# Patient Record
Sex: MALE | Race: WHITE | URBAN - METROPOLITAN AREA
[De-identification: names, ages, dates, MRNs, and addresses within clinical notes are randomized per-mention and may not be internally consistent; named-entity substitution may affect disease eponyms.]

---

## 2021-12-07 ENCOUNTER — HOSPITAL ENCOUNTER (OUTPATIENT)
Dept: WOUND CARE | Age: 36
Discharge: HOME | End: 2021-12-07
Payer: COMMERCIAL

## 2021-12-07 VITALS
DIASTOLIC BLOOD PRESSURE: 86 MMHG | RESPIRATION RATE: 20 BRPM | HEART RATE: 106 BPM | SYSTOLIC BLOOD PRESSURE: 151 MMHG | TEMPERATURE: 95.7 F

## 2021-12-07 DIAGNOSIS — R09.89 DECREASED PULSES IN FEET: Primary | ICD-10-CM

## 2021-12-07 DIAGNOSIS — I87.2 PERIPHERAL VENOUS INSUFFICIENCY: ICD-10-CM

## 2021-12-07 DIAGNOSIS — L97.922 NON-PRESSURE CHRONIC ULCER OF LEFT LOWER LEG WITH FAT LAYER EXPOSED (HCC): ICD-10-CM

## 2021-12-07 DIAGNOSIS — R21 MACULOPAPULAR RASH, GENERALIZED: ICD-10-CM

## 2021-12-07 PROCEDURE — 99213 OFFICE O/P EST LOW 20 MIN: CPT

## 2021-12-07 PROCEDURE — 99203 OFFICE O/P NEW LOW 30 MIN: CPT | Performed by: EMERGENCY MEDICINE

## 2021-12-07 RX ORDER — LEVOTHYROXINE SODIUM 0.07 MG/1
75 TABLET ORAL DAILY
COMMUNITY

## 2021-12-07 RX ORDER — BACITRACIN ZINC AND POLYMYXIN B SULFATE 500; 1000 [USP'U]/G; [USP'U]/G
OINTMENT TOPICAL ONCE
Status: CANCELLED | OUTPATIENT
Start: 2021-12-07 | End: 2021-12-07

## 2021-12-07 RX ORDER — CLOBETASOL PROPIONATE 0.5 MG/G
OINTMENT TOPICAL ONCE
Status: CANCELLED | OUTPATIENT
Start: 2021-12-07 | End: 2021-12-07

## 2021-12-07 RX ORDER — GENTAMICIN SULFATE 1 MG/G
OINTMENT TOPICAL ONCE
Status: CANCELLED | OUTPATIENT
Start: 2021-12-07 | End: 2021-12-07

## 2021-12-07 RX ORDER — GLIPIZIDE 10 MG/1
10 TABLET, FILM COATED, EXTENDED RELEASE ORAL DAILY
COMMUNITY

## 2021-12-07 RX ORDER — LIDOCAINE HYDROCHLORIDE 40 MG/ML
SOLUTION TOPICAL ONCE
Status: CANCELLED | OUTPATIENT
Start: 2021-12-07 | End: 2021-12-07

## 2021-12-07 RX ORDER — BETAMETHASONE DIPROPIONATE 0.05 %
OINTMENT (GRAM) TOPICAL ONCE
Status: CANCELLED | OUTPATIENT
Start: 2021-12-07 | End: 2021-12-07

## 2021-12-07 RX ORDER — BETAMETHASONE DIPROPIONATE 0.05 %
OINTMENT (GRAM) TOPICAL
Qty: 50 G | Refills: 1 | Status: SHIPPED | OUTPATIENT
Start: 2021-12-07

## 2021-12-07 RX ORDER — BACITRACIN, NEOMYCIN, POLYMYXIN B 400; 3.5; 5 [USP'U]/G; MG/G; [USP'U]/G
OINTMENT TOPICAL ONCE
Status: CANCELLED | OUTPATIENT
Start: 2021-12-07 | End: 2021-12-07

## 2021-12-07 RX ORDER — GINSENG 100 MG
CAPSULE ORAL ONCE
Status: CANCELLED | OUTPATIENT
Start: 2021-12-07 | End: 2021-12-07

## 2021-12-07 RX ORDER — LIDOCAINE 50 MG/G
OINTMENT TOPICAL ONCE
Status: CANCELLED | OUTPATIENT
Start: 2021-12-07 | End: 2021-12-07

## 2021-12-07 RX ORDER — LIDOCAINE HYDROCHLORIDE 20 MG/ML
JELLY TOPICAL ONCE
Status: CANCELLED | OUTPATIENT
Start: 2021-12-07 | End: 2021-12-07

## 2021-12-07 RX ORDER — PAROXETINE HYDROCHLORIDE 40 MG/1
40 TABLET, FILM COATED ORAL EVERY MORNING
COMMUNITY

## 2021-12-07 RX ORDER — LIDOCAINE 40 MG/G
CREAM TOPICAL ONCE
Status: CANCELLED | OUTPATIENT
Start: 2021-12-07 | End: 2021-12-07

## 2021-12-07 NOTE — H&P
[address] [facility]   History and Physical Note   Referring Provider: self referral  Reason for Referral: left leg ulcer    Stevenson Blackwood RECORD NUMBER:  [id number]  AGE: 43 y.o. GENDER: male  : 1985  EPISODE DATE:  2021    Chief complaint and reason for visit:     Chief Complaint   Patient presents with    Wound Check     Traumatic wound 2021. Continues to scratch it. Mid October placed on ABx         HISTORY of PRESENT ILLNESS HPI     Austin Alvares is a 43 y.o. male who presents today for an initial evaluation of a wound/ulcer. Patient is new to the wound center. Wound duration: aug 2021, but worse 10/2021. History of Wound Context: 75-year-old male with a history of morbid obesity, who indicated that he had exposure to some weeds or branches sometime in 2021. The patient states that there was some \"bubbling rash\" to the left anterior tibial surface which improved after he used a home remedy of baking soda. The patient then was placed on several courses of antibiotics by his PCP which also improved the wound. He noted a diffuse rash however associated with this wound. He was traveling recently and in the airport had a suitcase strike his left anterior leg which then made the wound much worse now with more areas of irritation and drainage. The patient therefore presents for an evaluation. No associated fever, chills, chest pain, shortness of breath, abdominal pain, nausea, vomiting or diarrhea. Has some mild discomfort at the site of the wound    Pertinent associated symptoms: pain severity: intermittent, mild, pain quality: sharp, burning, drainage , redness, swelling, skin discoloration and impaired mobility    PAST MEDICAL HISTORY        Diagnosis Date    Diabetes mellitus ([address]     Thyroid disease        PAST SURGICAL HISTORY    History reviewed. No pertinent surgical history. FAMILY HISTORY    History reviewed.  No pertinent family history. SOCIAL HISTORY    Social History     Tobacco Use    Smoking status: Never Smoker    Smokeless tobacco: Never Used   Substance Use Topics    Alcohol use: Not Currently    Drug use: Never       ALLERGIES    No Known Allergies    MEDICATIONS    Current Outpatient Medications on File Prior to Encounter   Medication Sig Dispense Refill    PARoxetine (PAXIL) 40 MG tablet Take 40 mg by mouth every morning      metFORMIN (GLUCOPHAGE) 1000 MG tablet Take 1,000 mg by mouth 2 times daily (with meals)      glipiZIDE (GLUCOTROL XL) 10 MG extended release tablet Take 10 mg by mouth daily      levothyroxine (SYNTHROID) 75 MCG tablet Take 75 mcg by mouth Daily       No current facility-administered medications on file prior to encounter. REVIEW OF SYSTEMS  A comprehensive review of systems was negative except for: Pertinent items are noted in HPI. Objective:      BP (!) 151/86   Pulse 106   Temp 95.7 °F (35.4 °C) (Infrared)   Resp 20     Wt Readings from Last 3 Encounters:   No data found for Wt       PHYSICAL EXAM  General Appearance/Constitutional: alert and oriented to person, place and time, well-developed and well nourished, and in no acute distress. Nontoxic. Skin: warm and dry, no rash, positive wound per LDA documentation. Head: normocephalic and atraumatic. Eyes: extraocular eye movements intact, conjunctivae normal, and sclera anicteric. ENT: hearing grossly normal bilaterally. Normal appearance. Pulmonary/Chest: no chest wall tenderness and clear anteriorly. No respiratory distress. Cardiovascular: normal rate and regular rhythm. GI: abdomen soft, non-tender and non-distended. Musculoskeletal: normal range of motion of joints. Nontender calves. No cyanosis. Nontender calves. Edema 3+  Neurologic: no gross cranial nerve deficit and speech normal. No focal deficits.  Mental status normal.      Medical Decision Making:     Patient appears to have a diffuse maculopapular rash, consistent with an allergic reaction. He has areas of petechial lesions as well. He denies any recent tick bite but did admit to exposure to weeds or some tree branches with pruritus leading to a lot of scratching. Patient has tried topical cortisone as well as antihistamines without significant improvement. He developed an ulcer to left anterior tibial surface which improved after being placed on antibiotics by his PCP. Patient also has some labs done, still pending. Problem List Items Addressed This Visit     Maculopapular rash, generalized    Peripheral venous insufficiency    Non-pressure chronic ulcer of left lower leg with fat layer exposed ([address]      Other Visit Diagnoses     Decreased pulses in feet    -  Primary    Relevant Orders    VL DUP LOWER EXTREMITY ARTERIES BILATERAL          Comorbid conditions affecting wound healing: Diabetes mellitus type 2, thyroid disease    Wound evaluation: Left anterior tibial surface with a darkened discoloration, deep erythematous changes without any warmth. Purpuric discoloration as well. Superficial ulcerations throughout with moderate drainage of serous type. Problems addressed during this encounter:  1. Left anterior tibial surface nonpressure chronic ulcer. Fat layers exposed. Silver alginate, compression therapy  2. Venous stasis with stasis dermatitis. Elevation and compression therapy  3. Petechial/maculopapular rash diffuse. Trial of betamethasone to the right lower extremity to see if this will help the rash. Data reviewed and analyzed:  1. Assessment required other independent historian(s): No patient . 2. Review of prior external note from other providers done today: Yes  3. New imaging or lab ordered today: Yes  4. Review of test results done today: No  5. Independent interpretation of test(s): No  6. Discussion of management or test interpretation with other qualified health care professional and other external source.     Risk of complications and/or mortality of patient management:  1. This patient has a moderate risk of morbidity and mortality from additional diagnostic testing or treatment. This is due to the above conditions affecting wound healing as well as patient and procedure risk factors. Education and discussion held with patient regarding these disease processes pertinent to wound(s). 2. Other pertinent decisions include: N/A .  3. The patient's diagnosis or treatment is not significantly limited by social determinants of health as noted by: N/A .  4. Prescription drug management: N/A     35 min spent with patient and patient care issues    Wound/Ulcer Descriptions are Pre Debridement except measurements:    Wound 12/07/21 Leg Left; Lateral; Lower #1 (Active)   Wound Image   12/07/21 1307   Wound Etiology Venous 12/07/21 1307   Wound Cleansed Cleansed with saline 12/07/21 1307   Dressing/Treatment ABD; Ace wrap; Roll gauze; Other (comment) 12/07/21 1524   Wound Length (cm) 4.5 cm 12/07/21 1307   Wound Width (cm) 5 cm 12/07/21 1307   Wound Depth (cm) 0.1 cm 12/07/21 1307   Wound Surface Area (cm^2) 22.5 cm^2 12/07/21 1307   Wound Volume (cm^3) 2.25 cm^3 12/07/21 1307   Wound Assessment Granulation tissue 12/07/21 1307   Drainage Amount Large 12/07/21 1307   Drainage Description Serous 12/07/21 1307   Odor None 12/07/21 1307   Fabi-wound Assessment Hemosiderin staining (brown yellow) 12/07/21 1307   Margins Attached edges; Undefined edges 12/07/21 1307   Number of days: 0       Written patient dismissal instructions given to patient and signed by patient or POA. Discharge Instructions         [address], [address] [place] [address] Road  Telephone: [phone]    Discharge Instructions    Important reminders:    1. Increase Protein intake for optimal wound healing  2. No added salt to reduce any swelling  3. If diabetic, maintain good glucose control  4.  If you smoke, smoking prohibits wound healing, we ask that you refrain from smoking. 5. When taking antibiotics take the entire prescription as ordered. Do not stop taking until medication is all gone unless otherwise instructed. 6. Exercise as tolerated. 7. Keep weight off wounds and reposition every 2 hours if applicable. 8. If wound(s) is on your lower extremity, elevate legs to the level of the heart or above for 30 minutes 4-5 times a day and/or when sitting. Avoid standing for long periods of time. 9. Do not get wounds wet in bath or shower unless otherwise instructed by your physician. If your wound is on your foot or leg, you may purchase a cast bag. Please ask at the pharmacy. If Vascular testing is ordered, please call [facility] ([phone]) to schedule. Vascular tests ordered by Wound Care Physicians may take up to 2 hours to complete. Please keep that in mind when scheduling. If Vascular testing is scheduled, please bring supplies to replace your dressing after testing is done. The vascular department does not stock supplies. Wound: Left Leg    With each dressing change, rinse wounds with 0.9% Saline. (May use wound wash or soft contact solution. Both can be purchased at a local drug store). If unable to obtain saline, may use a gentle soap and water. Dressing care: Left Leg- Silver Alginate, Drawtex, Abd, kerlix Ace wrap. Apply lotion to surrounding skin. Change every other day. Betamethasone to right leg where irritated. Cereve cream.    Home Care Agency:     Your wound-care supplies will be provided by:   Please note, depending on your insurance coverage, you may have out-of-pocket expenses for these supplies. Someone from the company should call you to confirm your order and discuss those potential costs before they ship your products -- please anticipate that call.  If your out-of-pocket cost could be substantial, Many companies have financial hardship programs for patients who qualify, so please ask about that if you might need a hand. If you have any questions about your supplies or your potential out-of-pocket costs, or if you need to place an order for a refill of supplies (typically monthly), please call the company directly. Your  is     Follow up with Dr Chastity Saleh In 1 week(s) in the wound care center. Wound Care Center Information: Should you experience any significant changes in your wound(s) or have questions about your wound care, please contact the [place] [address] at [phone] Monday  - Thursday 8:00 am - 4:00 pm and Friday 8:00 am - 1:00pm. If you need help with your wound outside these hours and cannot wait until we are again available, contact your PCP or go to the hospital emergency room. PLEASE NOTE: IF YOU ARE UNABLE TO OBTAIN WOUND SUPPLIES, CONTINUE TO USE THE SUPPLIES YOU HAVE AVAILABLE UNTIL YOU ARE ABLE TO REACH US. IT IS MOST IMPORTANT TO KEEP THE WOUND COVERED AT ALL TIMES.                 Electronically signed by Chary Sanchez MD on 12/7/2021 at 3:37 PM

## 2021-12-07 NOTE — PROGRESS NOTES
[address] [facility],[address] Floor:      [address] f: [phone] f: [phone] p: [phone] [email or web address]     Ordering Center: [address]  [place] [address]  [phone]  Dept: [phone]   Fax# [phone]    Patient Information:      Ausitn Alvares  [address] [place] [address]   [phone]   : 1985  AGE: 43 y.o. GENDER: male   TODAYS DATE:  2021    Insurance:      PRIMARY INSURANCE:  Plan: BCBS OUT OF STATE  Coverage: BCBS  Effective Date: 2015  Group Number: [unfilled]  Subscriber Number: [id number] - (BX Traditional)    Payor/Plan Subscr  Sex Relation Sub. Ins. ID Effective Group Num   1. [facility] 1985 Male Self [id number] 7/20/15 444453[facility] [address]         Patient Wound Information:     Additional ICD-10 Codes: Traumatic wound    Patient Active Problem List   Diagnosis Code    Maculopapular rash, generalized R21       WOUNDS REQUIRING DRESSING SUPPLIES:     Wound 21 Leg Left; Lateral; Lower #1 (Active)   Wound Image   21 1307   Wound Etiology Venous 21 1307   Wound Cleansed Cleansed with saline 21 1307   Wound Length (cm) 4.5 cm 21 1307   Wound Width (cm) 5 cm 21 1307   Wound Depth (cm) 0.1 cm 21 1307   Wound Surface Area (cm^2) 22.5 cm^2 21 1307   Wound Volume (cm^3) 2.25 cm^3 21 1307   Wound Assessment Granulation tissue 21 1307   Drainage Amount Large 21 1307   Drainage Description Serous 21 1307   Odor None 21 1307   Fabi-wound Assessment Hemosiderin staining (brown yellow) 21 1307   Margins Attached edges;  Undefined edges 21 1307   Number of days: 0          Supplies Requested :      WOUND #: 1   PRIMARY DRESSING:    Alginate with silver pad   Cover and Secure with: ABD pad  Conforming roll gauze  Other Drawtex FREQUENCY OF DRESSING CHANGES:  Every other day    Wound Thickness [x] Full   []Partial                                     Patient Wound(s) Debrided: [x] Yes   [] No    Debridement Date: 12/7/2021    Debribement Type: Mechanical     ADDITIONAL ITEMS:  [] Gloves Small  [] Gloves Medium [x] Gloves Large [] Gloves Monika Blacksmith  [] Paper Tape 1\" [] Paper Tape 2\" [] Paper Tape 3\"  [x] Medipore Tape 3\"  [x] Saline  [] Skin Prep   [] Adhesive Remover   [] Cotton Tip Applicators  [] Tubular Stocking   [] Size E  [] Size G  [] Other:    Patient currently being seen by Home Health: [] Yes   [x] No    Duration for needed supplies:  [x]15  []30  []60  []90 Days    Provider Information:      PROVIDER'S NAME/NPI  Dr Heather Aviles [id number]    I give permission to coordinate the care for this patient

## 2021-12-07 NOTE — PLAN OF CARE
Discharge instructions given. Patient verbalized understanding. Return to [address] [place] [address],[address] in 1 week(s).   Called/faxed orders to [facility]

## 2021-12-21 ENCOUNTER — HOSPITAL ENCOUNTER (OUTPATIENT)
Dept: WOUND CARE | Age: 36
Discharge: HOME | End: 2021-12-21
Payer: COMMERCIAL

## 2021-12-21 VITALS — SYSTOLIC BLOOD PRESSURE: 164 MMHG | TEMPERATURE: 96.9 F | HEART RATE: 91 BPM | DIASTOLIC BLOOD PRESSURE: 90 MMHG

## 2021-12-21 DIAGNOSIS — I87.2 PERIPHERAL VENOUS INSUFFICIENCY: Primary | ICD-10-CM

## 2021-12-21 DIAGNOSIS — R21 MACULOPAPULAR RASH, GENERALIZED: ICD-10-CM

## 2021-12-21 DIAGNOSIS — L97.922 NON-PRESSURE CHRONIC ULCER OF LEFT LOWER LEG WITH FAT LAYER EXPOSED (HCC): ICD-10-CM

## 2021-12-21 DIAGNOSIS — R09.89 DECREASED PULSES IN FEET: ICD-10-CM

## 2021-12-21 PROCEDURE — 99213 OFFICE O/P EST LOW 20 MIN: CPT

## 2021-12-21 PROCEDURE — 99213 OFFICE O/P EST LOW 20 MIN: CPT | Performed by: EMERGENCY MEDICINE

## 2021-12-21 RX ORDER — CLOBETASOL PROPIONATE 0.5 MG/G
OINTMENT TOPICAL ONCE
OUTPATIENT
Start: 2021-12-21 | End: 2021-12-21

## 2021-12-21 RX ORDER — BACITRACIN ZINC AND POLYMYXIN B SULFATE 500; 1000 [USP'U]/G; [USP'U]/G
OINTMENT TOPICAL ONCE
OUTPATIENT
Start: 2021-12-21 | End: 2021-12-21

## 2021-12-21 RX ORDER — GINSENG 100 MG
CAPSULE ORAL ONCE
OUTPATIENT
Start: 2021-12-21 | End: 2021-12-21

## 2021-12-21 RX ORDER — GENTAMICIN SULFATE 1 MG/G
OINTMENT TOPICAL ONCE
OUTPATIENT
Start: 2021-12-21 | End: 2021-12-21

## 2021-12-21 RX ORDER — LIDOCAINE 40 MG/G
CREAM TOPICAL ONCE
Status: DISCONTINUED | OUTPATIENT
Start: 2021-12-21 | End: 2021-12-22 | Stop reason: HOSPADM

## 2021-12-21 RX ORDER — LIDOCAINE 40 MG/G
CREAM TOPICAL ONCE
OUTPATIENT
Start: 2021-12-21 | End: 2021-12-21

## 2021-12-21 RX ORDER — BACITRACIN, NEOMYCIN, POLYMYXIN B 400; 3.5; 5 [USP'U]/G; MG/G; [USP'U]/G
OINTMENT TOPICAL ONCE
OUTPATIENT
Start: 2021-12-21 | End: 2021-12-21

## 2021-12-21 RX ORDER — BETAMETHASONE DIPROPIONATE 0.05 %
OINTMENT (GRAM) TOPICAL ONCE
OUTPATIENT
Start: 2021-12-21 | End: 2021-12-21

## 2021-12-21 RX ORDER — LIDOCAINE HYDROCHLORIDE 40 MG/ML
SOLUTION TOPICAL ONCE
OUTPATIENT
Start: 2021-12-21 | End: 2021-12-21

## 2021-12-21 RX ORDER — LIDOCAINE 50 MG/G
OINTMENT TOPICAL ONCE
OUTPATIENT
Start: 2021-12-21 | End: 2021-12-21

## 2021-12-21 RX ORDER — LIDOCAINE HYDROCHLORIDE 20 MG/ML
JELLY TOPICAL ONCE
OUTPATIENT
Start: 2021-12-21 | End: 2021-12-21

## 2021-12-21 NOTE — H&P
[address] [facility]   Progress note    Stevenson Blackwood RECORD NUMBER:  [id number]  AGE: 43 y.o. GENDER: male  : 1985  EPISODE DATE:  2021    Chief complaint and reason for visit:     Chief Complaint   Patient presents with    Wound Check     Left leg wound follow up      21: doing well. Has compression sleeve with reg sock. HISTORY of PRESENT ILLNESS HPI 21:   Jose Juan Schultz is a 43 y.o. male who pre/sents today for an initial evaluation of a wound/ulcer. Patient is new to the wound center. Wound duration: aug 2021, but worse 10/2021. History of Wound Context: 78-year-old male with a history of morbid obesity, who indicated that he had exposure to some weeds or branches sometime in 2021. The patient states that there was some \"bubbling rash\" to the left anterior tibial surface which improved after he used a home remedy of baking soda. The patient then was placed on several courses of antibiotics by his PCP which also improved the wound. He noted a diffuse rash however associated with this wound. He was traveling recently and in the airport had a suitcase strike his left anterior leg which then made the wound much worse now with more areas of irritation and drainage. The patient therefore presents for an evaluation. No associated fever, chills, chest pain, shortness of breath, abdominal pain, nausea, vomiting or diarrhea. Has some mild discomfort at the site of the wound    Pertinent associated symptoms: pain severity: intermittent, mild, pain quality: sharp, burning, drainage , redness, swelling, skin discoloration and impaired mobility    PAST MEDICAL HISTORY        Diagnosis Date    Diabetes mellitus ([address]     Thyroid disease        PAST SURGICAL HISTORY    History reviewed. No pertinent surgical history. FAMILY HISTORY    History reviewed. No pertinent family history.     SOCIAL HISTORY    Social History     Tobacco Use    Smoking status: Never Smoker    Smokeless tobacco: Never Used   Substance Use Topics    Alcohol use: Not Currently    Drug use: Never       ALLERGIES    No Known Allergies    MEDICATIONS    Current Outpatient Medications on File Prior to Encounter   Medication Sig Dispense Refill    PARoxetine (PAXIL) 40 MG tablet Take 40 mg by mouth every morning      metFORMIN (GLUCOPHAGE) 1000 MG tablet Take 1,000 mg by mouth 2 times daily (with meals)      glipiZIDE (GLUCOTROL XL) 10 MG extended release tablet Take 10 mg by mouth daily      levothyroxine (SYNTHROID) 75 MCG tablet Take 75 mcg by mouth Daily      betamethasone dipropionate (DIPROLENE) 0.05 % ointment Apply topically daily. 50 g 1     No current facility-administered medications on file prior to encounter. REVIEW OF SYSTEMS  A comprehensive review of systems was negative except for: Pertinent items are noted in HPI. Objective:      BP (!) 164/90   Pulse 91   Temp 96.9 °F (36.1 °C) (Infrared)     Wt Readings from Last 3 Encounters:   No data found for Wt       PHYSICAL EXAM  General Appearance/Constitutional: alert and oriented to person, place and time, well-developed and well nourished, and in no acute distress. Nontoxic. Skin: warm and dry, no rash, positive wound per LDA documentation. Head: normocephalic and atraumatic. Eyes: extraocular eye movements intact, conjunctivae normal, and sclera anicteric. ENT: hearing grossly normal bilaterally. Normal appearance. Pulmonary/Chest: no chest wall tenderness and clear anteriorly. No respiratory distress. Cardiovascular: normal rate and regular rhythm. GI: abdomen soft, non-tender and non-distended. Musculoskeletal: normal range of motion of joints. Nontender calves. No cyanosis. Nontender calves. Edema 3+  Neurologic: no gross cranial nerve deficit and speech normal. No focal deficits.  Mental status normal.      Medical Decision Making:     Patient appears to have a diffuse maculopapular rash, consistent with an allergic reaction. He has areas of petechial lesions as well. He denies any recent tick bite but did admit to exposure to weeds or some tree branches with pruritus leading to a lot of scratching. Patient has tried topical cortisone as well as antihistamines without significant improvement. He developed an ulcer to left anterior tibial surface which improved after being placed on antibiotics by his PCP. Patient also had some labs done. Problem List Items Addressed This Visit     Maculopapular rash, generalized    Relevant Medications    lidocaine (LMX) 4 % cream (Start on 12/21/2021  1:30 PM)    Other Relevant Orders    Initiate Outpatient Wound Care Protocol    Peripheral venous insufficiency - Primary    Relevant Medications    lidocaine (LMX) 4 % cream (Start on 12/21/2021  1:30 PM)    Other Relevant Orders    Initiate Outpatient Wound Care Protocol    Non-pressure chronic ulcer of left lower leg with fat layer exposed ([address]    Relevant Medications    lidocaine (LMX) 4 % cream (Start on 12/21/2021  1:30 PM)    Other Relevant Orders    Initiate Outpatient Wound Care Protocol    Decreased pulses in feet    Relevant Medications    lidocaine (LMX) 4 % cream (Start on 12/21/2021  1:30 PM)    Other Relevant Orders    Initiate Outpatient Wound Care Protocol          Comorbid conditions affecting wound healing: Diabetes mellitus type 2, thyroid disease    Wound evaluation: Left anterior tibial surface with a darkened discoloration, deep erythematous changes without any warmth. Purpuric discoloration as well. Healed. Problems addressed during this encounter:  1. Left anterior tibial surface nonpressure chronic ulcer. Healed. Continue with current compression sleeve and sock per patient request. Recommended stockings but pt doesn't want to pursue  2. Venous stasis with stasis dermatitis. Elevation and compression therapy  3. Petechial/maculopapular rash diffuse.   Trial of betamethasone to the right lower extremity to see if this will help the rash. Data reviewed and analyzed:  1. Assessment required other independent historian(s): No patient . 2. Review of prior external note from other providers done today: No  3. New imaging or lab ordered today: Yes, venous reflux study  4. Review of test results done today: No  5. Independent interpretation of test(s): No  6. Discussion of management or test interpretation with other qualified health care professional and other external source. Risk of complications and/or mortality of patient management:  1. This patient has a moderate risk of morbidity and mortality from additional diagnostic testing or treatment. This is due to the above conditions affecting wound healing as well as patient and procedure risk factors. Education and discussion held with patient regarding these disease processes pertinent to wound(s). 2. Other pertinent decisions include: N/A .  3. The patient's diagnosis or treatment is not significantly limited by social determinants of health as noted by: N/A .  4. Prescription drug management: N/A     15 min spent with patient and patient care issues    Wound/Ulcer Descriptions are Pre Debridement except measurements:    Wound 12/07/21 Leg Left; Lateral; Lower #1 (Active)   Wound Image   12/07/21 1307   Wound Etiology Venous 12/21/21 1315   Wound Cleansed Cleansed with saline 12/07/21 1307   Dressing/Treatment ABD; Ace wrap;Roll gauze; Other (comment) 12/07/21 1524   Wound Length (cm) 0 cm 12/21/21 1315   Wound Width (cm) 0 cm 12/21/21 1315   Wound Depth (cm) 0 cm 12/21/21 1315   Wound Surface Area (cm^2) 0 cm^2 12/21/21 1315   Change in Wound Size % (l*w) 100 12/21/21 1315   Wound Volume (cm^3) 0 cm^3 12/21/21 1315   Wound Healing % 100 12/21/21 1315   Wound Assessment Epithelialization 12/21/21 1315   Drainage Amount Large 12/07/21 1307   Drainage Description Serous 12/07/21 1307   Odor None 12/07/21 1307 Fabi-wound Assessment Hemosiderin staining (brown yellow) 12/21/21 1315   Margins Attached edges; Undefined edges 12/07/21 1307   Number of days: 14       Written patient dismissal instructions given to patient and signed by patient or POA. Discharge [address] [facility]  [address] [place] [address], [address]  Telephone: [phone]     Discharge Instructions     Important reminders:     1. Increase Protein intake for optimal wound healing  2. No added salt to reduce any swelling  3. If diabetic, maintain good glucose control  4. If you smoke, smoking prohibits wound healing, we ask that you refrain from smoking. 5. When taking antibiotics take the entire prescription as ordered. Do not stop taking until medication is all gone unless otherwise instructed. 6. Exercise as tolerated. 7. Keep weight off wounds and reposition every 2 hours if applicable. 8. If wound(s) is on your lower extremity, elevate legs to the level of the heart or above for 30 minutes 4-5 times a day and/or when sitting. Avoid standing for long periods of time. 9. Do not get wounds wet in bath or shower unless otherwise instructed by your physician. If your wound is on your foot or leg, you may purchase a cast bag. Please ask at the pharmacy.        If Vascular testing is ordered, please call [address] [place] [address] [phone]) to schedule.     Vascular tests ordered by Wound Care Physicians may take up to 2 hours to complete. Please keep that in mind when scheduling.      If Vascular testing is scheduled, please bring supplies to replace your dressing after testing is done. The vascular department does not stock supplies.      Wound: Left Leg     With each dressing change, rinse wounds with 0.9% Saline. (May use wound wash or soft contact solution. Both can be purchased at a local drug store).  If unable to obtain saline, may use a gentle soap and water.     Dressing care: Left Leg- Silver

## 2021-12-21 NOTE — PLAN OF CARE
Discharge instructions given. Patient verbalized understanding.   No Return to [address] [place] [address],[address]   Healed

## 2022-01-18 ENCOUNTER — HOSPITAL ENCOUNTER (OUTPATIENT)
Dept: WOUND CARE | Age: 37
Discharge: HOME | End: 2022-01-18
Payer: COMMERCIAL

## 2022-01-18 VITALS — TEMPERATURE: 96.9 F | RESPIRATION RATE: 20 BRPM

## 2022-01-18 DIAGNOSIS — R09.89 DECREASED PULSES IN FEET: ICD-10-CM

## 2022-01-18 DIAGNOSIS — L97.922 NON-PRESSURE CHRONIC ULCER OF LEFT LOWER LEG WITH FAT LAYER EXPOSED (HCC): ICD-10-CM

## 2022-01-18 DIAGNOSIS — R21 MACULOPAPULAR RASH, GENERALIZED: Primary | ICD-10-CM

## 2022-01-18 DIAGNOSIS — I87.2 PERIPHERAL VENOUS INSUFFICIENCY: ICD-10-CM

## 2022-01-18 PROCEDURE — 99212 OFFICE O/P EST SF 10 MIN: CPT

## 2022-01-18 PROCEDURE — 99213 OFFICE O/P EST LOW 20 MIN: CPT | Performed by: EMERGENCY MEDICINE

## 2022-01-18 RX ORDER — LIDOCAINE HYDROCHLORIDE 20 MG/ML
JELLY TOPICAL ONCE
OUTPATIENT
Start: 2022-01-18 | End: 2022-01-18

## 2022-01-18 RX ORDER — BACITRACIN, NEOMYCIN, POLYMYXIN B 400; 3.5; 5 [USP'U]/G; MG/G; [USP'U]/G
OINTMENT TOPICAL ONCE
OUTPATIENT
Start: 2022-01-18 | End: 2022-01-18

## 2022-01-18 RX ORDER — GINSENG 100 MG
CAPSULE ORAL ONCE
OUTPATIENT
Start: 2022-01-18 | End: 2022-01-18

## 2022-01-18 RX ORDER — GENTAMICIN SULFATE 1 MG/G
OINTMENT TOPICAL ONCE
OUTPATIENT
Start: 2022-01-18 | End: 2022-01-18

## 2022-01-18 RX ORDER — BACITRACIN ZINC AND POLYMYXIN B SULFATE 500; 1000 [USP'U]/G; [USP'U]/G
OINTMENT TOPICAL ONCE
OUTPATIENT
Start: 2022-01-18 | End: 2022-01-18

## 2022-01-18 RX ORDER — CLOBETASOL PROPIONATE 0.5 MG/G
OINTMENT TOPICAL ONCE
OUTPATIENT
Start: 2022-01-18 | End: 2022-01-18

## 2022-01-18 RX ORDER — LIDOCAINE 50 MG/G
OINTMENT TOPICAL ONCE
OUTPATIENT
Start: 2022-01-18 | End: 2022-01-18

## 2022-01-18 RX ORDER — BETAMETHASONE DIPROPIONATE 0.05 %
OINTMENT (GRAM) TOPICAL ONCE
OUTPATIENT
Start: 2022-01-18 | End: 2022-01-18

## 2022-01-18 RX ORDER — LIDOCAINE HYDROCHLORIDE 40 MG/ML
SOLUTION TOPICAL ONCE
OUTPATIENT
Start: 2022-01-18 | End: 2022-01-18

## 2022-01-18 RX ORDER — LIDOCAINE 40 MG/G
CREAM TOPICAL ONCE
OUTPATIENT
Start: 2022-01-18 | End: 2022-01-18

## 2022-01-18 NOTE — PLAN OF CARE
Discharge instructions given. Patient verbalized understanding.   No Return to [facility]   Called/faxed orders to  [facility]  Healed

## 2022-01-18 NOTE — PROGRESS NOTES
Fabi-wound Assessment Hemosiderin staining (brown yellow) 01/18/22 1306   Margins Attached edges; Defined edges 01/18/22 1306   Number of days: 42          Supplies Requested :      WOUND #: 1   PRIMARY DRESSING:    Alginate with silver pad   Cover and Secure with: ABD pad     FREQUENCY OF DRESSING CHANGES:  Daily    Wound Thickness [x] Full   []Partial                                     Patient Wound(s) Debrided: [x] Yes   [] No    Debridement Date: 1/18/2022    Debribement Type: Mechanical     ADDITIONAL ITEMS:  [] Gloves Small  [x] Gloves Medium [] Gloves Large [] Gloves Edwyna Clipper  [] Paper Tape 1\" [] Paper Tape 2\" [] Paper Tape 3\"  [] Medipore Tape 3\"  [] Saline  [] Skin Prep   [] Adhesive Remover   [] Cotton Tip Applicators  [] Tubular Stocking   [] Size E  [] Size G  [] Other:    Patient currently being seen by Home Health: [] Yes   [x] No    Duration for needed supplies:  []15  [x]30  []60  []90 Days    Provider Information:      PROVIDER'S NAME/NPI  Dr Yesica Ralph [id number]    I give permission to coordinate the care for this patient

## 2022-01-18 NOTE — H&P
[facility]   Progress note    Stevenson Blackwood RECORD NUMBER:  [id number]  AGE: 43 y.o. GENDER: male  : 1985  EPISODE DATE:  2022    Chief complaint and reason for visit:     Chief Complaint   Patient presents with    Wound Check     Follow up left leg wound      21: doing well. Has compression sleeve with reg sock. 22: wound to left leg due to recent injury on 22. Previously healed. HISTORY of PRESENT ILLNESS HPI 21:   Sierra Lee is a 43 y.o. male who pre/sents today for an initial evaluation of a wound/ulcer. Patient is new to the wound center. Wound duration: aug 2021, but worse 10/2021. History of Wound Context: 41-year-old male with a history of morbid obesity, who indicated that he had exposure to some weeds or branches sometime in 2021. The patient states that there was some \"bubbling rash\" to the left anterior tibial surface which improved after he used a home remedy of baking soda. The patient then was placed on several courses of antibiotics by his PCP which also improved the wound. He noted a diffuse rash however associated with this wound. He was traveling recently and in the airport had a suitcase strike his left anterior leg which then made the wound much worse now with more areas of irritation and drainage. The patient therefore presents for an evaluation. No associated fever, chills, chest pain, shortness of breath, abdominal pain, nausea, vomiting or diarrhea. Has some mild discomfort at the site of the wound    Pertinent associated symptoms: pain severity: intermittent, mild, pain quality: sharp, burning, drainage , redness, swelling, skin discoloration and impaired mobility    PAST MEDICAL HISTORY        Diagnosis Date    Diabetes mellitus ([address]     Thyroid disease        PAST SURGICAL HISTORY    History reviewed. No pertinent surgical history.     FAMILY HISTORY    History normal.      Medical Decision Making:     Patient appears to have a diffuse maculopapular rash, consistent with an allergic reaction. He has areas of petechial lesions as well. He denies any recent tick bite but did admit to exposure to weeds or some tree branches with pruritus leading to a lot of scratching. Patient has tried topical cortisone as well as antihistamines without significant improvement. He developed an ulcer to left anterior tibial surface which improved after being placed on antibiotics by his PCP. Patient also had some labs done. Problem List Items Addressed This Visit     Maculopapular rash, generalized - Primary    Relevant Orders    Initiate Outpatient Wound Care Protocol    Peripheral venous insufficiency    Relevant Orders    Initiate Outpatient Wound Care Protocol    Non-pressure chronic ulcer of left lower leg with fat layer exposed ([address]    Relevant Orders    Initiate Outpatient Wound Care Protocol    Decreased pulses in feet    Relevant Orders    Initiate Outpatient Wound Care Protocol          Comorbid conditions affecting wound healing: Diabetes mellitus type 2, thyroid disease    Wound evaluation: Left anterior tibial surface with a darkened discoloration, deep erythematous changes without any warmth. Purpuric discoloration as well. Healed previously. Opened pinpoint area now due to recent trauma (hit leg against hard object)    Problems addressed during this encounter:  1. New left anterior tibial surface nonpressure chronic ulcer since hitting leg on 1/14/22. Mechanically debrided with lidocaine and gauze. Will do silver alginate, abd. Continue with current compression sleeve and sock per patient request. Recommended stockings but pt doesn't want to pursue  2. Venous stasis with stasis dermatitis. Elevation and compression therapy  3. Petechial/maculopapular rash diffuse. Trial of betamethasone to the right lower extremity to see if this will help the rash.     Data reviewed and analyzed:  1. Assessment required other independent historian(s): No patient . 2. Review of prior external note from other providers done today: No  3. New imaging or lab ordered today: Yes, venous reflux study but pt decided to hold off.  4. Review of test results done today: No  5. Independent interpretation of test(s): No  6. Discussion of management or test interpretation with other qualified health care professional and other external source. Risk of complications and/or mortality of patient management:  1. This patient has a moderate risk of morbidity and mortality from additional diagnostic testing or treatment. This is due to the above conditions affecting wound healing as well as patient and procedure risk factors. Education and discussion held with patient regarding these disease processes pertinent to wound(s). 2. Other pertinent decisions include: N/A .  3. The patient's diagnosis or treatment is not significantly limited by social determinants of health as noted by: N/A .  4. Prescription drug management: N/A     20 min spent with patient and patient care issues    Wound/Ulcer Descriptions are Pre Debridement except measurements:    Wound 12/07/21 Leg Left; Lateral; Lower #1 (Active)   Wound Image   01/18/22 1306   Wound Etiology Venous 01/18/22 1306   Wound Cleansed Cleansed with saline 12/07/21 1307   Dressing/Treatment ABD; Ace wrap;Roll gauze; Other (comment) 12/07/21 1524   Wound Length (cm) 0.5 cm 01/18/22 1306   Wound Width (cm) 0.2 cm 01/18/22 1306   Wound Depth (cm) 0.1 cm 01/18/22 1306   Wound Surface Area (cm^2) 0.1 cm^2 01/18/22 1306   Change in Wound Size % (l*w) 99.56 01/18/22 1306   Wound Volume (cm^3) 0.01 cm^3 01/18/22 1306   Wound Healing % 100 01/18/22 1306   Wound Assessment Pink/red 01/18/22 1306   Drainage Amount Scant 01/18/22 1306   Drainage Description Serosanguinous 01/18/22 1306   Odor None 01/18/22 1306   Fabi-wound Assessment Hemosiderin staining (Gustavo Moreno yellow) 01/18/22 1306   Margins Attached edges; Defined edges 01/18/22 1306   Number of days: 43       Written patient dismissal instructions given to patient and signed by patient or POA. Discharge Instructions       [address], [address] [place] [address] Road  Telephone: [phone]     FAX [phone]     Discharge Instructions   Congratulations! You have completed your treatment program. We have outlined a list of reminders to assist you in maintaining your continues health.     1. Return to your primary care physician for all your health issues. 2. Resume your ordinary activities as prescribed. 3. Take your medications as prescribed by your doctor. 4. Check your skin daily for cracks, bruises, sores, or dryness. 5. Clean and dry your skin thoroughly. 6. Avoid alcohol. Quit smoking if applicable. 7. Maintain a nutritious diet; take a multivitamin daily. 8. Avoid pressure on the wound site. Keep your legs elevated above the level of your heart whenever possible. 9. Continue to use wraps/stockings/compresion if prescribed/  10. Replace compression hose/stockings every 6 months to insure proper fit. 11. Wear well fitting shoes.     Call the [address] [place] [address] Road if your wound reopens or you develop new wounds or if you have any other questions about follow up care [phone]     Important reminders:     1. Increase Protein intake for optimal wound healing  2. No added salt to reduce any swelling  3. If diabetic, maintain good glucose control  4. If you smoke, smoking prohibits wound healing, we ask that you refrain from smoking. 5. When taking antibiotics take the entire prescription as ordered. Do not stop taking until medication is all gone unless otherwise instructed. 6. Exercise as tolerated. 7. Keep weight off wounds and reposition every 2 hours if applicable.   8. If wound(s) is on your lower extremity, elevate legs to the level of the heart or above for 30 minutes 4-5 times a day and/or when sitting. Avoid standing for long periods of time. 9. Do not get wounds wet in bath or shower unless otherwise instructed by your physician. If your wound is on your foot or leg, you may purchase a cast bag. Please ask at the pharmacy.        If Vascular testing is ordered, please call [address] [place] [address] [phone]) to schedule.     Vascular tests ordered by Wound Care Physicians may take up to 2 hours to complete. Please keep that in mind when scheduling.      If Vascular testing is scheduled, please bring supplies to replace your dressing after testing is done. The vascular department does not stock supplies.      Wound: Left Leg     With each dressing change, rinse wounds with 0.9% Saline. (May use wound wash or soft contact solution. Both can be purchased at a local drug store). If unable to obtain saline, may use a gentle soap and water.     Dressing care: Left Leg- . Betamethasone to leg where irritated. Cereve cream. Continue to Wear stockings. Can see us  after venous studies     Home Care Agency:      Your wound-care supplies will be provided by:   Please note, depending on your insurance coverage, you may have out-of-pocket expenses for these supplies. Someone from the company should call you to confirm your order and discuss those potential costs before they ship your products -- please anticipate that call. If your out-of-pocket cost could be substantial, Many companies have financial hardship programs for patients who qualify, so please ask about that if you might need a hand.  If you have any questions about your supplies or your potential out-of-pocket costs, or if you need to place an order for a refill of supplies (typically monthly), please call the company directly.      Your  is      No Follow up with Dr Dora Purcell In 1 week(s) in the wound care center.   [address] Information: Should you experience any significant changes in your wound(s) or have questions about your wound care, please contact the [facility]  [phone] Monday  - Thursday 8:00 am - 4:00 pm and Friday 8:00 am - 1:00pm. If you need help with your wound outside these hours and cannot wait until we are again available, contact your PCP or go to the hospital emergency room.      PLEASE NOTE: IF YOU ARE UNABLE TO [address], CONTINUE TO USE THE SUPPLIES YOU HAVE AVAILABLE UNTIL YOU ARE ABLE TO [facility]. IT IS MOST IMPORTANT TO KEEP THE WOUND COVERED AT ALL TIMES.         Electronically signed by Baxter Angelucci, MD on 1/18/2022 at 1:45 PM